# Patient Record
Sex: MALE | URBAN - METROPOLITAN AREA
[De-identification: names, ages, dates, MRNs, and addresses within clinical notes are randomized per-mention and may not be internally consistent; named-entity substitution may affect disease eponyms.]

---

## 2023-11-18 ENCOUNTER — EMERGENCY (EMERGENCY)
Facility: HOSPITAL | Age: 23
LOS: 1 days | Discharge: HOME | End: 2023-11-18
Attending: EMERGENCY MEDICINE | Admitting: STUDENT IN AN ORGANIZED HEALTH CARE EDUCATION/TRAINING PROGRAM
Payer: SELF-PAY

## 2023-11-18 VITALS
WEIGHT: 119.93 LBS | RESPIRATION RATE: 16 BRPM | TEMPERATURE: 98 F | SYSTOLIC BLOOD PRESSURE: 104 MMHG | HEIGHT: 66 IN | HEART RATE: 80 BPM | DIASTOLIC BLOOD PRESSURE: 47 MMHG | OXYGEN SATURATION: 98 %

## 2023-11-18 VITALS
OXYGEN SATURATION: 98 % | SYSTOLIC BLOOD PRESSURE: 105 MMHG | HEART RATE: 80 BPM | RESPIRATION RATE: 18 BRPM | DIASTOLIC BLOOD PRESSURE: 55 MMHG | TEMPERATURE: 98 F

## 2023-11-18 DIAGNOSIS — R44.0 AUDITORY HALLUCINATIONS: ICD-10-CM

## 2023-11-18 DIAGNOSIS — F43.20 ADJUSTMENT DISORDER, UNSPECIFIED: ICD-10-CM

## 2023-11-18 DIAGNOSIS — F17.200 NICOTINE DEPENDENCE, UNSPECIFIED, UNCOMPLICATED: ICD-10-CM

## 2023-11-18 DIAGNOSIS — Z59.00 HOMELESSNESS UNSPECIFIED: ICD-10-CM

## 2023-11-18 LAB
ALBUMIN SERPL ELPH-MCNC: 4.1 G/DL — SIGNIFICANT CHANGE UP (ref 3.3–5)
ALBUMIN SERPL ELPH-MCNC: 4.1 G/DL — SIGNIFICANT CHANGE UP (ref 3.3–5)
ALP SERPL-CCNC: 79 U/L — SIGNIFICANT CHANGE UP (ref 40–120)
ALP SERPL-CCNC: 79 U/L — SIGNIFICANT CHANGE UP (ref 40–120)
ALT FLD-CCNC: 17 U/L — SIGNIFICANT CHANGE UP (ref 10–45)
ALT FLD-CCNC: 17 U/L — SIGNIFICANT CHANGE UP (ref 10–45)
ANION GAP SERPL CALC-SCNC: 10 MMOL/L — SIGNIFICANT CHANGE UP (ref 5–17)
ANION GAP SERPL CALC-SCNC: 10 MMOL/L — SIGNIFICANT CHANGE UP (ref 5–17)
APAP SERPL-MCNC: <5 UG/ML — LOW (ref 10–30)
APAP SERPL-MCNC: <5 UG/ML — LOW (ref 10–30)
AST SERPL-CCNC: 19 U/L — SIGNIFICANT CHANGE UP (ref 10–40)
AST SERPL-CCNC: 19 U/L — SIGNIFICANT CHANGE UP (ref 10–40)
BASOPHILS # BLD AUTO: 0.03 K/UL — SIGNIFICANT CHANGE UP (ref 0–0.2)
BASOPHILS # BLD AUTO: 0.03 K/UL — SIGNIFICANT CHANGE UP (ref 0–0.2)
BASOPHILS NFR BLD AUTO: 0.5 % — SIGNIFICANT CHANGE UP (ref 0–2)
BASOPHILS NFR BLD AUTO: 0.5 % — SIGNIFICANT CHANGE UP (ref 0–2)
BILIRUB SERPL-MCNC: 0.3 MG/DL — SIGNIFICANT CHANGE UP (ref 0.2–1.2)
BILIRUB SERPL-MCNC: 0.3 MG/DL — SIGNIFICANT CHANGE UP (ref 0.2–1.2)
BUN SERPL-MCNC: 14 MG/DL — SIGNIFICANT CHANGE UP (ref 7–23)
BUN SERPL-MCNC: 14 MG/DL — SIGNIFICANT CHANGE UP (ref 7–23)
CALCIUM SERPL-MCNC: 9.2 MG/DL — SIGNIFICANT CHANGE UP (ref 8.4–10.5)
CALCIUM SERPL-MCNC: 9.2 MG/DL — SIGNIFICANT CHANGE UP (ref 8.4–10.5)
CHLORIDE SERPL-SCNC: 103 MMOL/L — SIGNIFICANT CHANGE UP (ref 96–108)
CHLORIDE SERPL-SCNC: 103 MMOL/L — SIGNIFICANT CHANGE UP (ref 96–108)
CO2 SERPL-SCNC: 26 MMOL/L — SIGNIFICANT CHANGE UP (ref 22–31)
CO2 SERPL-SCNC: 26 MMOL/L — SIGNIFICANT CHANGE UP (ref 22–31)
CREAT SERPL-MCNC: 1 MG/DL — SIGNIFICANT CHANGE UP (ref 0.5–1.3)
CREAT SERPL-MCNC: 1 MG/DL — SIGNIFICANT CHANGE UP (ref 0.5–1.3)
EGFR: 109 ML/MIN/1.73M2 — SIGNIFICANT CHANGE UP
EGFR: 109 ML/MIN/1.73M2 — SIGNIFICANT CHANGE UP
EOSINOPHIL # BLD AUTO: 0.12 K/UL — SIGNIFICANT CHANGE UP (ref 0–0.5)
EOSINOPHIL # BLD AUTO: 0.12 K/UL — SIGNIFICANT CHANGE UP (ref 0–0.5)
EOSINOPHIL NFR BLD AUTO: 1.8 % — SIGNIFICANT CHANGE UP (ref 0–6)
EOSINOPHIL NFR BLD AUTO: 1.8 % — SIGNIFICANT CHANGE UP (ref 0–6)
ETHANOL SERPL-MCNC: <10 MG/DL — SIGNIFICANT CHANGE UP (ref 0–10)
ETHANOL SERPL-MCNC: <10 MG/DL — SIGNIFICANT CHANGE UP (ref 0–10)
GLUCOSE SERPL-MCNC: 93 MG/DL — SIGNIFICANT CHANGE UP (ref 70–99)
GLUCOSE SERPL-MCNC: 93 MG/DL — SIGNIFICANT CHANGE UP (ref 70–99)
HCT VFR BLD CALC: 34.2 % — LOW (ref 39–50)
HCT VFR BLD CALC: 34.2 % — LOW (ref 39–50)
HGB BLD-MCNC: 11 G/DL — LOW (ref 13–17)
HGB BLD-MCNC: 11 G/DL — LOW (ref 13–17)
IMM GRANULOCYTES NFR BLD AUTO: 0.3 % — SIGNIFICANT CHANGE UP (ref 0–0.9)
IMM GRANULOCYTES NFR BLD AUTO: 0.3 % — SIGNIFICANT CHANGE UP (ref 0–0.9)
LYMPHOCYTES # BLD AUTO: 1.99 K/UL — SIGNIFICANT CHANGE UP (ref 1–3.3)
LYMPHOCYTES # BLD AUTO: 1.99 K/UL — SIGNIFICANT CHANGE UP (ref 1–3.3)
LYMPHOCYTES # BLD AUTO: 30.6 % — SIGNIFICANT CHANGE UP (ref 13–44)
LYMPHOCYTES # BLD AUTO: 30.6 % — SIGNIFICANT CHANGE UP (ref 13–44)
MCHC RBC-ENTMCNC: 27.6 PG — SIGNIFICANT CHANGE UP (ref 27–34)
MCHC RBC-ENTMCNC: 27.6 PG — SIGNIFICANT CHANGE UP (ref 27–34)
MCHC RBC-ENTMCNC: 32.2 GM/DL — SIGNIFICANT CHANGE UP (ref 32–36)
MCHC RBC-ENTMCNC: 32.2 GM/DL — SIGNIFICANT CHANGE UP (ref 32–36)
MCV RBC AUTO: 85.7 FL — SIGNIFICANT CHANGE UP (ref 80–100)
MCV RBC AUTO: 85.7 FL — SIGNIFICANT CHANGE UP (ref 80–100)
MONOCYTES # BLD AUTO: 0.56 K/UL — SIGNIFICANT CHANGE UP (ref 0–0.9)
MONOCYTES # BLD AUTO: 0.56 K/UL — SIGNIFICANT CHANGE UP (ref 0–0.9)
MONOCYTES NFR BLD AUTO: 8.6 % — SIGNIFICANT CHANGE UP (ref 2–14)
MONOCYTES NFR BLD AUTO: 8.6 % — SIGNIFICANT CHANGE UP (ref 2–14)
NEUTROPHILS # BLD AUTO: 3.79 K/UL — SIGNIFICANT CHANGE UP (ref 1.8–7.4)
NEUTROPHILS # BLD AUTO: 3.79 K/UL — SIGNIFICANT CHANGE UP (ref 1.8–7.4)
NEUTROPHILS NFR BLD AUTO: 58.2 % — SIGNIFICANT CHANGE UP (ref 43–77)
NEUTROPHILS NFR BLD AUTO: 58.2 % — SIGNIFICANT CHANGE UP (ref 43–77)
NRBC # BLD: 0 /100 WBCS — SIGNIFICANT CHANGE UP (ref 0–0)
NRBC # BLD: 0 /100 WBCS — SIGNIFICANT CHANGE UP (ref 0–0)
PLATELET # BLD AUTO: 269 K/UL — SIGNIFICANT CHANGE UP (ref 150–400)
PLATELET # BLD AUTO: 269 K/UL — SIGNIFICANT CHANGE UP (ref 150–400)
POTASSIUM SERPL-MCNC: 3.7 MMOL/L — SIGNIFICANT CHANGE UP (ref 3.5–5.3)
POTASSIUM SERPL-MCNC: 3.7 MMOL/L — SIGNIFICANT CHANGE UP (ref 3.5–5.3)
POTASSIUM SERPL-SCNC: 3.7 MMOL/L — SIGNIFICANT CHANGE UP (ref 3.5–5.3)
POTASSIUM SERPL-SCNC: 3.7 MMOL/L — SIGNIFICANT CHANGE UP (ref 3.5–5.3)
PROT SERPL-MCNC: 6.9 G/DL — SIGNIFICANT CHANGE UP (ref 6–8.3)
PROT SERPL-MCNC: 6.9 G/DL — SIGNIFICANT CHANGE UP (ref 6–8.3)
RBC # BLD: 3.99 M/UL — LOW (ref 4.2–5.8)
RBC # BLD: 3.99 M/UL — LOW (ref 4.2–5.8)
RBC # FLD: 12.5 % — SIGNIFICANT CHANGE UP (ref 10.3–14.5)
RBC # FLD: 12.5 % — SIGNIFICANT CHANGE UP (ref 10.3–14.5)
SALICYLATES SERPL-MCNC: <0.3 MG/DL — LOW (ref 2.8–20)
SALICYLATES SERPL-MCNC: <0.3 MG/DL — LOW (ref 2.8–20)
SARS-COV-2 RNA SPEC QL NAA+PROBE: SIGNIFICANT CHANGE UP
SARS-COV-2 RNA SPEC QL NAA+PROBE: SIGNIFICANT CHANGE UP
SODIUM SERPL-SCNC: 139 MMOL/L — SIGNIFICANT CHANGE UP (ref 135–145)
SODIUM SERPL-SCNC: 139 MMOL/L — SIGNIFICANT CHANGE UP (ref 135–145)
WBC # BLD: 6.51 K/UL — SIGNIFICANT CHANGE UP (ref 3.8–10.5)
WBC # BLD: 6.51 K/UL — SIGNIFICANT CHANGE UP (ref 3.8–10.5)
WBC # FLD AUTO: 6.51 K/UL — SIGNIFICANT CHANGE UP (ref 3.8–10.5)
WBC # FLD AUTO: 6.51 K/UL — SIGNIFICANT CHANGE UP (ref 3.8–10.5)

## 2023-11-18 PROCEDURE — 99284 EMERGENCY DEPT VISIT MOD MDM: CPT | Mod: 25

## 2023-11-18 PROCEDURE — 90792 PSYCH DIAG EVAL W/MED SRVCS: CPT | Mod: 95

## 2023-11-18 PROCEDURE — 93010 ELECTROCARDIOGRAM REPORT: CPT

## 2023-11-18 PROCEDURE — 93005 ELECTROCARDIOGRAM TRACING: CPT

## 2023-11-18 PROCEDURE — 99053 MED SERV 10PM-8AM 24 HR FAC: CPT

## 2023-11-18 PROCEDURE — 99285 EMERGENCY DEPT VISIT HI MDM: CPT

## 2023-11-18 SDOH — ECONOMIC STABILITY - HOUSING INSECURITY: HOMELESSNESS UNSPECIFIED: Z59.00

## 2023-11-18 NOTE — ED BEHAVIORAL HEALTH ASSESSMENT NOTE - NSBHMSESPEECH_PSY_A_CORE
End of Shift Note    Bedside shift change report given to Rubén Nelson (oncoming nurse) by Kerry Mckeon RN (offgoing nurse). Report included the following information SBAR, Kardex, ED Summary, Intake/Output, MAR and Recent Results    Shift worked:  7a-7p     Shift summary and any significant changes:    Pt put on bedpan several times,incontinence care complete, rounding complete, pt NPO in the morning for port placement and now tolerating previous diet.  Pt hopefully to d/c tomorrow   Concerns for physician to address: none   Zone phone for oncoming shift:  none         Kerry Mckeon, RN Normal volume, rate, productivity, spontaneity and articulation
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
## 2023-11-18 NOTE — ED PROVIDER NOTE - NSFOLLOWUPINSTRUCTIONS_ED_ALL_ED_FT
Hallucinations    WHAT YOU NEED TO KNOW:    Hallucinations are things you see, hear, feel, taste, or smell that seem real but are not. Some hallucinations are temporary. Hallucinations that continue, interfere with daily activities, or worsen may be a sign of a serious medical or mental condition that needs treatment.    DISCHARGE INSTRUCTIONS:    Call 911 if you or someone else notices any of the following:    You want to harm yourself or someone else.    You hear voices telling you to harm yourself or someone else.    You have a seizure.    You are confused, do not know where you are, or are not making sense when you speak.  Return to the emergency department if:    Your hallucinations get worse.    You vomit several times in a row.    Your heartbeat or breathing is faster or slower than usual.    You have trouble breathing or shortness of breath.  Contact your healthcare provider if:    You have new hallucinations.    You have questions or concerns about your condition or care.  Medicines:    Medicines may be given to stop the hallucinations, reduce anxiety, or relax your muscles.    Take your medicine as directed. Contact your healthcare provider if you think your medicine is not helping or if you have side effects. Tell your provider if you are allergic to any medicine. Keep a list of the medicines, vitamins, and herbs you take. Include the amounts, and when and why you take them. Bring the list or the pill bottles to follow-up visits. Carry your medicine list with you in case of an emergency.  Follow up with your healthcare provider as directed: Write down your questions so you remember to ask them during your visits.

## 2023-11-18 NOTE — ED PROVIDER NOTE - PATIENT PORTAL LINK FT
You can access the FollowMyHealth Patient Portal offered by [facility] by registering at the following website: [email or web address]. By joining [employer]’s FollowMyHealth portal, you will also be able to view your health information using other applications (apps) compatible with our system.

## 2023-11-18 NOTE — ED PROVIDER NOTE - PROGRESS NOTE DETAILS
pending psych recs pt cleared by telepsych, will give shelter info  I have discussed the discharge plan with the patient. The patient agrees with the plan, as discussed.  The patient understands Emergency Department diagnosis is a preliminary diagnosis often based on limited information and that the patient must adhere to the follow-up plan as discussed.  The patient understands that if the symptoms worsen the patient may return to the Emergency Department at any time for further evaluation and treatment.

## 2023-11-18 NOTE — ED BEHAVIORAL HEALTH ASSESSMENT NOTE - NSBHMSEINTELL_PSY_A_CORE
YOB: 1947 Encounter date: 04/03/2020 Phone: [phone]            Details: · Patient: [YA SAUCEDA] ......... Discharge Date: [4/4/2020] ......... [facility] Facility: [[facility]] ......... Disposition: [Home or Self Care] ......... LACE Score: [5] ......... Primary Diagnosis: [] ......... Admission Diagnosis: [Left sided numbness, Numbness on left side] ========= INSTRUCTIONS: Initiate Transitional Care Management telephone encounter within 2 business days of 4/4/2020. Verify that the patient has a TCM appt scheduled. If not, please schedule. Appt should be within 7 days but no later than 14 days. Reference the TCM workflow for further details.         Average
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
## 2023-11-18 NOTE — ED BEHAVIORAL HEALTH ASSESSMENT NOTE - RISK ASSESSMENT
Static rf: prior psych hx, h/o legal issues  Modifiable rf: homelessness, not connected to care, ?reported psychosis  PF: See above

## 2023-11-18 NOTE — ED BEHAVIORAL HEALTH ASSESSMENT NOTE - HPI (INCLUDE ILLNESS QUALITY, SEVERITY, DURATION, TIMING, CONTEXT, MODIFYING FACTORS, ASSOCIATED SIGNS AND SYMPTOMS)
Pt is a 24 yo M, single, non-caregiver, originally from [place], came to [facility] 2 days ago to reportedly visit a friend, has been staying on the streets since arriving, reports that he's employed(however refuses to disclose his line of work), denies PMH, with self-reported psych h/o psychosis, ?1 prior psych admission several years ago for AH, denies prior suicide attempts/NSSIB, denies h/o aggression or legal issues, however chart review indicates he was charged with armed robbery and assault and battery when he was 19 yo, denies active substance use, not currently in outpt treatment or on meds, who self presents reporting AH and that someone put a spell on him.     On assessment, pt is found resting comfortably in the armchair in no apparent distress, he is A&O x4, and presents as calm, euthymic, linear, and future-oriented, with no e/o internal preoccupation or agitation, and is largely vague about the reasons he came to the ED. He states that he came to the ED tonight, because he's been "hearing voices", which began several years ago, which tell him "dumb things", and sound "like a person". He denies that they're command in nature, but otherwise provides no other details about the frequency of the voices or what they sound like. He also endorses VH of a "ghost", which he reports when he was interviewed by this writer, but again was vague in his description and did not evidence signs of being distracted or scanning the room. The pt states he came from [place] two days ago to visit a friend here in [facility], but reports that he and this friend got into an argument and the pt has been staying on the street because he has nowhere else to stay. He repeats he's "stranded" and Throughout the interview, the pt is also an inconsistent historian. For instance, he initially states he was living with grandmother in a private residence in [place] up until a few days ago, but then later states he was homeless in [place]. He also reports that he was Pt is a 24 yo M, single, non-caregiver, originally from [place], came to [facility] 2 days ago to reportedly visit a friend, has been staying on the streets since arriving, reports that he's employed(however refuses to disclose his line of work), denies PMH, with self-reported psych h/o psychosis, ?1 prior psych admission several years ago for AH, denies prior suicide attempts/NSSIB, denies h/o aggression or legal issues, however chart review indicates he was charged with armed robbery and assault and battery when he was 19 yo, denies active substance use, not currently in outpt treatment or on meds, who self presents reporting AH and that someone put a spell on him.     On assessment, pt is found resting comfortably in the armchair in no apparent distress, he is A&O x4, and presents as calm, euthymic, linear, and future-oriented, with no e/o internal preoccupation or agitation. He states that he came to the ED tonight, because he's been "hearing voices", which began several years ago, which tell him "dumb things". He denies that they're command in nature, but is otherwise vague about frequency of the voices, how they sound, or what they say. When asked to describe what they sound like, he replies "I don't know. Like a person." and replies they say "dumb things" when asked. He also endorses VH of a "ghost", which he reports seeing during the interview with this writer, but again is vague in his description of the VH and did not evidence signs of being distracted or scanning the room on exam. Similarly, he only confirms that he thinks there's been a spell cast on him, but replies "I don't know" when asked to elaborate. The pt states he came from [place] two days ago to visit a friend here in [facility], but reports that he's been staying on the street because he and his friend got into an argument. He repeatedly states he's "stranded", because he does not have money to return to [place], and requests help with finding a place to stay. He denies that he's been to any other emergency departments since leaving [place] and denies telling the ED staff that he had gone to one in [place] yesterday(as documented in the chart). Throughout the interview, the pt evidenced other inconsistencies in the history he provides. For instance, he initially states he was living with grandmother in a private residence in [place] up until a few days ago, but then later states he was homeless in [place] before he left for [facility]. He also later states he came to the ED to receive assistance in finding shelter and not because of the voices he's been hearing and states he would like to receive information on shelters.     On ROS, the pt denies current or recent SI/HI, urges to harm himself or others, IOR, or PI, denies recent SIB, aggression, or substance use, and denies access to firearms. The pt refused to provide contacts for collateral when asked.

## 2023-11-18 NOTE — ED ADULT NURSE NOTE - OBJECTIVE STATEMENT
Pt complaints of auditory hallucinations for years. Today states that someone spell on him. Denies SI, HI.  Patient is on yellow gown with red socks. Security waned patient for safety and collected patient's belongings. 1:1 continued since pt arrival. Environment remains free of all ligature risks and safety hazards utilizing environmental safety checklist. Ambulated to bathroom with 1:1 in arms reach. Denies needs at this time.

## 2023-11-18 NOTE — ED BEHAVIORAL HEALTH ASSESSMENT NOTE - DETAILS
Denies Deferred Pt denies a h/o aggression, but review of legal records indicate he was charged with assault and battery and armed robbery when he was 17 yo pt self presented Pt refused to complete a safety plan with this writer

## 2023-11-18 NOTE — ED BEHAVIORAL HEALTH ASSESSMENT NOTE - SUMMARY
Pt is a 22 yo M, single, non-caregiver, originally from [place], came to [facility] 2 days ago to reportedly visit a friend, has been staying on the streets since arriving, reports that he's employed(however refuses to disclose his line of work), denies PMH, with self-reported psych h/o psychosis, ?1 prior psych admission several years ago for AH, denies prior suicide attempts/NSSIB, denies h/o aggression or legal issues, however chart review indicates he was charged with armed robbery and assault and battery when he was 17 yo, denies active substance use, not currently in outpt treatment or on meds, who self presents reporting AH and that someone put a spell on him.     Based on the pt's history and exam, his current presentation seems consistent with secondary gain - specifically to receive assistance in finding shelter. This is evidenced by the fact he is vague in his description of his symptoms, his reported symptoms are not consistent with his MSE(ie no e/o internal preoccupation, suspiciousness, distress, etc), he provides a largely vague and inconsistent history(ie tells this writer he's been to no EDs since leaving [place], but told ED team he presented to one in [place]; he states he was living with grandmother in [place], but later states he was homeless, etc.), and he refuses to provide contacts for collateral to corroborate his history. He also repeatedly requests assistance in finding a place to stay during the interview since he is currently "stranded" in [place] and ultimately tells this writer that this was his main motivation in presenting to the ED. He currently denies urges to harm himself or others, denies current or recent SI/HI, presents as future-oriented and help-seeking, has linear TP, has remained in good behavioral control in the ED, denies access to firearms, and evidences no neurovegetative symptoms of a mood or thought disorder on exam.     Given the above, this writer does not consider the pt to be an imminent risk of harm to self or other and he currently does not evidence signs of an acute psychiatric condition that would be amenable to hospitalization. As such, he does not meet criteria for inpatient psychiatric hospitalization. Instead, he would more likely benefit from being connected with outpt services and being provided resources for shelter options in [facility].     -No acute psychiatric contraindication to discharge  -Please provide pt with outpt psych referrals and resources on housing options faxed over by telepsych  -Pt instructed to return to the ED for acute psychiatric symptoms or safety concerns in the future

## 2023-11-18 NOTE — ED BEHAVIORAL HEALTH ASSESSMENT NOTE - NSACTIVEVENT_PSY_ALL_CORE
Triggering events leading to humiliation, shame, and/or despair (e.g., Loss of relationship, financial or health status) (real or anticipated)/Legal problems/Pending incarceration or homelessness/Inadequate social supports